# Patient Record
Sex: FEMALE | Race: WHITE | NOT HISPANIC OR LATINO
[De-identification: names, ages, dates, MRNs, and addresses within clinical notes are randomized per-mention and may not be internally consistent; named-entity substitution may affect disease eponyms.]

---

## 2019-01-08 PROBLEM — Z00.00 ENCOUNTER FOR PREVENTIVE HEALTH EXAMINATION: Status: ACTIVE | Noted: 2019-01-08

## 2019-01-09 ENCOUNTER — APPOINTMENT (OUTPATIENT)
Dept: OBGYN | Facility: CLINIC | Age: 79
End: 2019-01-09
Payer: MEDICARE

## 2019-01-09 DIAGNOSIS — Z82.49 FAMILY HISTORY OF ISCHEMIC HEART DISEASE AND OTHER DISEASES OF THE CIRCULATORY SYSTEM: ICD-10-CM

## 2019-01-09 DIAGNOSIS — Z78.9 OTHER SPECIFIED HEALTH STATUS: ICD-10-CM

## 2019-01-09 DIAGNOSIS — Z63.4 DISAPPEARANCE AND DEATH OF FAMILY MEMBER: ICD-10-CM

## 2019-01-09 DIAGNOSIS — R35.1 NOCTURIA: ICD-10-CM

## 2019-01-09 DIAGNOSIS — N89.8 OTHER SPECIFIED NONINFLAMMATORY DISORDERS OF VAGINA: ICD-10-CM

## 2019-01-09 DIAGNOSIS — R35.0 FREQUENCY OF MICTURITION: ICD-10-CM

## 2019-01-09 LAB
BILIRUB UR QL STRIP: NORMAL
DATE COLLECTED: NORMAL
GLUCOSE UR-MCNC: NORMAL
HCG UR QL: 0.2 EU/DL
HEMOCCULT SP1 STL QL: NEGATIVE
HGB UR QL STRIP.AUTO: NORMAL
KETONES UR-MCNC: NORMAL
LEUKOCYTE ESTERASE UR QL STRIP: NORMAL
NITRITE UR QL STRIP: NORMAL
PH UR STRIP: 5.5
PROT UR STRIP-MCNC: NORMAL
QUALITY CONTROL: YES
SP GR UR STRIP: 1.02

## 2019-01-09 PROCEDURE — 99397 PER PM REEVAL EST PAT 65+ YR: CPT

## 2019-01-09 PROCEDURE — 99213 OFFICE O/P EST LOW 20 MIN: CPT | Mod: 25

## 2019-01-09 PROCEDURE — 81003 URINALYSIS AUTO W/O SCOPE: CPT | Mod: QW

## 2019-01-09 PROCEDURE — 82270 OCCULT BLOOD FECES: CPT

## 2019-01-09 SDOH — SOCIAL STABILITY - SOCIAL INSECURITY: DISSAPEARANCE AND DEATH OF FAMILY MEMBER: Z63.4

## 2019-01-09 NOTE — HISTORY OF PRESENT ILLNESS
[1 Year Ago] : 1 year ago [Last Bone Density ___] : Last bone density studies [unfilled] [Last Pap ___] : Last cervical pap smear was [unfilled] [unknown] : the patient is unsure of the date of her LMP [Last Mammogram ___] : Last Mammogram was [unfilled] [Sexually Active] : is not sexually active

## 2019-01-09 NOTE — PHYSICAL EXAM
[Awake] : awake [Alert] : alert [Normal Appearance] : was normal in appearance [Neck Supple] : was supple [Examination Of The Breasts] : a normal appearance [No Masses] : no breast masses were palpable [Soft] : soft [Soft, Nontender] : the abdomen was soft and nontender [No Mass] : no masses were palpated [No HSM] : no hepatosplenomegaly noted [Oriented x3] : oriented to person, place, and time [Normal] : uterus [Atrophy] : atrophy [No Bleeding] : there was no active vaginal bleeding [Uterine Adnexae] : were not tender and not enlarged [Acute Distress] : no acute distress [Enlarged Diffusely] : was not enlarged [Mass] : no breast mass [Nipple Discharge] : no nipple discharge [Axillary LAD] : no axillary lymphadenopathy [Tender] : non tender [de-identified] : hypopigmented bilateral labia minora c/w lichen sclerosis

## 2019-06-25 ENCOUNTER — RECORD ABSTRACTING (OUTPATIENT)
Age: 79
End: 2019-06-25

## 2019-06-25 DIAGNOSIS — Z87.59 PERSONAL HISTORY OF OTHER COMPLICATIONS OF PREGNANCY, CHILDBIRTH AND THE PUERPERIUM: ICD-10-CM

## 2019-06-25 DIAGNOSIS — E78.5 HYPERLIPIDEMIA, UNSPECIFIED: ICD-10-CM

## 2019-06-25 DIAGNOSIS — Z87.898 PERSONAL HISTORY OF OTHER SPECIFIED CONDITIONS: ICD-10-CM

## 2019-06-25 DIAGNOSIS — Z80.41 FAMILY HISTORY OF MALIGNANT NEOPLASM OF OVARY: ICD-10-CM

## 2019-06-25 DIAGNOSIS — Z92.89 PERSONAL HISTORY OF OTHER MEDICAL TREATMENT: ICD-10-CM

## 2019-06-25 DIAGNOSIS — M85.80 OTHER SPECIFIED DISORDERS OF BONE DENSITY AND STRUCTURE, UNSPECIFIED SITE: ICD-10-CM

## 2019-06-25 DIAGNOSIS — N39.41 URGE INCONTINENCE: ICD-10-CM

## 2019-06-25 DIAGNOSIS — Z92.29 PERSONAL HISTORY OF OTHER DRUG THERAPY: ICD-10-CM

## 2019-06-25 DIAGNOSIS — I10 ESSENTIAL (PRIMARY) HYPERTENSION: ICD-10-CM

## 2019-06-25 DIAGNOSIS — N95.2 POSTMENOPAUSAL ATROPHIC VAGINITIS: ICD-10-CM

## 2019-06-25 DIAGNOSIS — Z82.62 FAMILY HISTORY OF OSTEOPOROSIS: ICD-10-CM

## 2019-06-25 LAB — CYTOLOGY CVX/VAG DOC THIN PREP: NORMAL

## 2019-06-26 ENCOUNTER — APPOINTMENT (OUTPATIENT)
Dept: OBGYN | Facility: CLINIC | Age: 79
End: 2019-06-26
Payer: MEDICARE

## 2019-06-26 VITALS
SYSTOLIC BLOOD PRESSURE: 108 MMHG | WEIGHT: 169 LBS | BODY MASS INDEX: 34.07 KG/M2 | DIASTOLIC BLOOD PRESSURE: 74 MMHG | HEIGHT: 59 IN

## 2019-06-26 DIAGNOSIS — B37.9 CANDIDIASIS, UNSPECIFIED: ICD-10-CM

## 2019-06-26 PROCEDURE — 99213 OFFICE O/P EST LOW 20 MIN: CPT

## 2019-06-26 PROCEDURE — 81003 URINALYSIS AUTO W/O SCOPE: CPT | Mod: QW

## 2019-06-26 RX ORDER — UBIDECARENONE/VIT E ACET 100MG-5
CAPSULE ORAL
Refills: 0 | Status: ACTIVE | COMMUNITY

## 2019-06-29 LAB
BILIRUB UR QL STRIP: ABNORMAL
CLARITY UR: CLEAR
COLLECTION METHOD: NORMAL
GLUCOSE UR-MCNC: NORMAL
HGB UR QL STRIP.AUTO: NORMAL
KETONES UR-MCNC: 15
LEUKOCYTE ESTERASE UR QL STRIP: NORMAL
NITRITE UR QL STRIP: NORMAL
PH UR STRIP: 5.5
PROT UR STRIP-MCNC: 30
SP GR UR STRIP: 1.03

## 2019-06-29 NOTE — HISTORY OF PRESENT ILLNESS
[Menarche Age: ____] : age at menarche was [unfilled] [Post-Menopause, No Sxs] : post-menopausal, currently without symptoms [Last Mammogram ___] : Last Mammogram was [unfilled] [Last Bone Density ___] : Last bone density studies [unfilled] [Last Pap ___] : Last cervical pap smear was [unfilled] [Sexually Active] : is not sexually active [Contraception] : does not use contraception

## 2019-06-29 NOTE — COUNSELING
[FreeTextEntry2] : IMportance of good glycemic control in preventing cutaneous skin infections reviewed with the pt

## 2019-06-29 NOTE — REVIEW OF SYSTEMS
[Frequency] : frequency [Urgency] : urgency [Nocturia] : nocturia [Nl] : Integumentary [Sight Problems] : sight problems [Sleep Disturbances] : sleep disturbances [Libido Decreased] : libido decreased [FreeTextEntry1] : excessive itching, rashes

## 2019-09-03 ENCOUNTER — APPOINTMENT (OUTPATIENT)
Dept: OBGYN | Facility: CLINIC | Age: 79
End: 2019-09-03
Payer: MEDICARE

## 2019-09-03 VITALS
BODY MASS INDEX: 32.39 KG/M2 | DIASTOLIC BLOOD PRESSURE: 80 MMHG | HEIGHT: 60 IN | WEIGHT: 165 LBS | SYSTOLIC BLOOD PRESSURE: 120 MMHG

## 2019-09-03 LAB
DATE COLLECTED: NORMAL
HEMOCCULT SP1 STL QL: NEGATIVE
QUALITY CONTROL: YES

## 2019-09-03 PROCEDURE — 99213 OFFICE O/P EST LOW 20 MIN: CPT

## 2019-09-03 PROCEDURE — 82270 OCCULT BLOOD FECES: CPT

## 2019-09-03 NOTE — PHYSICAL EXAM
[Awake] : awake [Alert] : alert [Examination Of The Breasts] : a normal appearance [No Discharge] : no discharge [No Masses] : no breast masses were palpable [Soft] : soft [Oriented x3] : oriented to person, place, and time [Labia Majora] : labia major [Normal] : clitoris [Labia Minora] : labia minora [Atrophy] : atrophy [Uterine Adnexae] : were not tender and not enlarged [Dry Mucosa] : dry mucosa [No Bleeding] : there was no active vaginal bleeding [No Tenderness] : no rectal tenderness [Nl Sphincter Tone] : normal sphincter tone [Acute Distress] : no acute distress [Mass] : no breast mass [Nipple Discharge] : no nipple discharge [Axillary LAD] : no axillary lymphadenopathy [Tender] : non tender [Flat Affect] : affect not flat [Depressed Mood] : not depressed [Vulvar Atrophy] : vulvar atrophy [Uterine Prolapse] : uterine prolapse [Occult Blood] : occult blood test from digital rectal exam was negative [de-identified] : hypopigmented bilateral labia extending to clitoral marcos

## 2019-09-03 NOTE — HISTORY OF PRESENT ILLNESS
[Good] : being in good health [Healthy Diet] : a healthy diet [Weight Concerns] : weight concens [Last Mammogram ___] : Last Mammogram was [unfilled] [Last Bone Density ___] : Last bone density studies [unfilled] [Last Pap ___] : Last cervical pap smear was [unfilled] [No Previous Colonoscopy] : no previous colonoscopy [Postmenopausal] : is postmenopausal [HPV Vaccine NA Due to Age] : HPV vaccine not available to patient due to age [unknown] : the patient is unsure of the date of her LMP [Menarche Age: ____] : age at menarche was [unfilled] [___ Month(s) Ago] : [unfilled] month(s) ago [Regular Exercise] : not exercising regularly [Sexually Active] : is not sexually active

## 2019-09-03 NOTE — END OF VISIT
[FreeTextEntry3] : I, Roosevelt Goldstein, acted solely as a scribe for Dr. Sargent on this date 09/03/2019.\par All medical record entries made by the Scribe were at my, Dr. Sargent’s direction and personally dictated by me on  09/03/2019. I have reviewed the chart and agree that the record accurately reflects my personal performance of the history, physical exam, assessment and plan. I have also personally directed, reviewed, and agreed with the chart.\par \par

## 2020-02-26 ENCOUNTER — RX RENEWAL (OUTPATIENT)
Age: 80
End: 2020-02-26

## 2020-06-05 ENCOUNTER — RESULT REVIEW (OUTPATIENT)
Age: 80
End: 2020-06-05

## 2020-07-22 ENCOUNTER — RX RENEWAL (OUTPATIENT)
Age: 80
End: 2020-07-22

## 2020-07-24 DIAGNOSIS — Z01.818 ENCOUNTER FOR OTHER PREPROCEDURAL EXAMINATION: ICD-10-CM

## 2020-07-26 ENCOUNTER — APPOINTMENT (OUTPATIENT)
Dept: DISASTER EMERGENCY | Facility: CLINIC | Age: 80
End: 2020-07-26

## 2020-07-27 LAB — SARS-COV-2 N GENE NPH QL NAA+PROBE: NOT DETECTED

## 2020-07-29 ENCOUNTER — RESULT REVIEW (OUTPATIENT)
Age: 80
End: 2020-07-29

## 2020-11-16 ENCOUNTER — APPOINTMENT (OUTPATIENT)
Dept: OBGYN | Facility: CLINIC | Age: 80
End: 2020-11-16

## 2020-11-17 ENCOUNTER — APPOINTMENT (OUTPATIENT)
Dept: OBGYN | Facility: CLINIC | Age: 80
End: 2020-11-17
Payer: MEDICARE

## 2020-11-17 VITALS
DIASTOLIC BLOOD PRESSURE: 76 MMHG | BODY MASS INDEX: 31.41 KG/M2 | SYSTOLIC BLOOD PRESSURE: 136 MMHG | WEIGHT: 160 LBS | HEIGHT: 60 IN | TEMPERATURE: 97.1 F

## 2020-11-17 PROCEDURE — 99213 OFFICE O/P EST LOW 20 MIN: CPT

## 2020-11-18 NOTE — DISCUSSION/SUMMARY
[FreeTextEntry1] : I recommended urogyn evaluation for uterine prolapse. Dr. Pascal contact info provided.\par I also recommended vulvar biopsy for area of discoloration seen in posterior labia/vulvar border on the right side, she is declining at this time. She would prefer periodic surveillance at her next annual in Jan/feb. She has not been using clobetasol for the lichen sclerosus.

## 2020-11-18 NOTE — HISTORY OF PRESENT ILLNESS
[HIV test declined] : HIV test declined [Syphilis test declined] : Syphilis test declined [Gonorrhea test declined] : Gonorrhea test declined [Chlamydia test declined] : Chlamydia test declined [Trichomonas test declined] : Trichomonas test declined [HPV test declined] : HPV test declined [Hepatitis B test declined] : Hepatitis B test declined [Hepatitis C test declined] : Hepatitis C test declined [LMP unknown] : LMP unknown [postmenopausal] : postmenopausal [N] : Patient is not sexually active [Y] : Positive pregnancy history [unknown] : Patient is unsure of the date of her LMP [Menarche Age: ____] : age at menarche was [unfilled] [Menopause Age: ____] : age at menopause was [unfilled] [No] : Patient does not have concerns regarding sex [Previously active] : previously active [FreeTextEntry1] : She noticed a sensation of something coming out of her vagina a few days ago. Denies any issues with urination. She has a history of uterine prolapse but this feels like an actual lump. Denies any pain.  [PapSmeardate] : 7/19/20187 [TextBox_31] : wnl [PGHxTotal] : 4 [[facility]] : 3 [[facility] Corporationiving] : 3 [PGHxABInduced] : 1

## 2020-11-18 NOTE — PHYSICAL EXAM
[Appropriately responsive] : appropriately responsive [Alert] : alert [No Acute Distress] : no acute distress [Soft] : soft [Non-tender] : non-tender [Non-distended] : non-distended [Oriented x3] : oriented x3 [Cystocele] : a cystocele [Uterine Prolapse] : uterine prolapse [No Bleeding] : There was no active vaginal bleeding [Normal] : normal [FreeTextEntry1] : lichen sclerosus involving bilateral labia from clitoral marcos to perineum. area on right posterior labia with slight discoloration, darkish hue [FreeTextEntry2] : lichen sclerosus also affecting the labia [FreeTextEntry4] : cervix prolapsing to the introitus  [FreeTextEntry6] : prolapse as above

## 2021-01-15 ENCOUNTER — APPOINTMENT (OUTPATIENT)
Dept: OBGYN | Facility: CLINIC | Age: 81
End: 2021-01-15
Payer: MEDICARE

## 2021-01-15 VITALS
TEMPERATURE: 97.2 F | HEIGHT: 60 IN | DIASTOLIC BLOOD PRESSURE: 100 MMHG | SYSTOLIC BLOOD PRESSURE: 140 MMHG | BODY MASS INDEX: 32.79 KG/M2 | WEIGHT: 167 LBS

## 2021-01-15 DIAGNOSIS — Z01.419 ENCOUNTER FOR GYNECOLOGICAL EXAMINATION (GENERAL) (ROUTINE) W/OUT ABNORMAL FINDINGS: ICD-10-CM

## 2021-01-15 DIAGNOSIS — N81.4 UTEROVAGINAL PROLAPSE, UNSPECIFIED: ICD-10-CM

## 2021-01-15 DIAGNOSIS — Z12.39 ENCOUNTER FOR OTHER SCREENING FOR MALIGNANT NEOPLASM OF BREAST: ICD-10-CM

## 2021-01-15 DIAGNOSIS — L90.0 LICHEN SCLEROSUS ET ATROPHICUS: ICD-10-CM

## 2021-01-15 DIAGNOSIS — Z12.4 ENCOUNTER FOR SCREENING FOR MALIGNANT NEOPLASM OF CERVIX: ICD-10-CM

## 2021-01-15 DIAGNOSIS — Z78.9 OTHER SPECIFIED HEALTH STATUS: ICD-10-CM

## 2021-01-15 DIAGNOSIS — B37.2 CANDIDIASIS OF SKIN AND NAIL: ICD-10-CM

## 2021-01-15 LAB
DATE COLLECTED: NORMAL
HEMOCCULT SP1 STL QL: NEGATIVE
QUALITY CONTROL: YES

## 2021-01-15 PROCEDURE — 99213 OFFICE O/P EST LOW 20 MIN: CPT | Mod: 25

## 2021-01-15 PROCEDURE — G0101: CPT

## 2021-01-15 PROCEDURE — 82270 OCCULT BLOOD FECES: CPT

## 2021-01-15 RX ORDER — ASPIRIN 81 MG
81 TABLET, DELAYED RELEASE (ENTERIC COATED) ORAL
Refills: 0 | Status: DISCONTINUED | COMMUNITY
End: 2021-01-15

## 2021-01-15 NOTE — PHYSICAL EXAM
[Appropriately responsive] : appropriately responsive [Alert] : alert [No Acute Distress] : no acute distress [No Lymphadenopathy] : no lymphadenopathy [Soft] : soft [Non-tender] : non-tender [Non-distended] : non-distended [No HSM] : No HSM [No Lesions] : no lesions [No Mass] : no mass [Oriented x3] : oriented x3 [Examination Of The Breasts] : a normal appearance [No Masses] : no breast masses were palpable [Atrophy] : atrophy [Cystocele] : a cystocele [Dry Mucosa] : dry mucosa [Normal] : normal [Uterine Adnexae] : normal [No Tenderness] : no tenderness [Nl Sphincter Tone] : normal sphincter tone [Uterine Prolapse] : uterine prolapse [FreeTextEntry1] : hypopigmented changes noted c/w Lichen sclerosis  [FreeTextEntry2] : hypopigmented changes noted bilaterally  c/w Lichen sclerosis  [FreeTextEntry5] : mild cervical prolapse and anterior wall detachment noted. [FreeTextEntry9] : Guaiac negative

## 2021-01-15 NOTE — END OF VISIT
[FreeTextEntry3] : I, Roosevelt Goldstein, acted solely as a scribe for Dr. Sargent on this date 01/15/2021.\par All medical record entries made by the Scribe were at my, Dr. Sargent's direction and personally dictated by me on  01/15/2021. I have reviewed the chart and agree that the record accurately reflects my personal performance of the history, physical exam, assessment and plan. I have also personally directed, reviewed, and agreed with the chart.\par \par

## 2021-01-15 NOTE — DISCUSSION/SUMMARY
[FreeTextEntry1] : We discussed the findings on physical exam - mild cervical prolapse and anterior wall detachment noted. hypopigmented changes noted on external genitalia. \par \par Management options of prolapse was discussed including pessary placement or surgical intervention. She denies having any discomfort with prolapse and would like to monitor it. She will consider her options. If her symptoms get worsens she will RTO for evaluation, possible pessary placement  or will see Urogynecologist.  She is schedule to see urogynecology in few weeks. Advised to keep her appointment with Urogynecology. She expressed understanding.\par \par Rx for Elocon cream and Clotrimazole- betamethasone were sent to the pharmacy. \par \par Discussed again the importance of  mammogram screening - patient declines \par \par All questions and concerns were addressed.\par \par \par \par

## 2021-01-15 NOTE — REVIEW OF SYSTEMS
[Patient Intake Form Reviewed] : Patient intake form was reviewed [Chills] : chills [Negative] : Heme/Lymph

## 2021-01-15 NOTE — HISTORY OF PRESENT ILLNESS
[Patient reported colonoscopy was normal] : Patient reported colonoscopy was normal [LMP unknown] : LMP unknown [N] : Patient is not sexually active [Y] : Positive pregnancy history [unknown] : Patient is unsure of the date of her LMP [Menarche Age: ____] : age at menarche was [unfilled] [Previously active] : previously active [Men] : men [Vaginal] : vaginal [No] : No [Patient refuses STI testing] : Patient refuses STI testing [FreeTextEntry1] : Camelia is here for her annual exam. She is doing well. She was seen here in 11/2020 by Dr. Bailey for evaluation of prolapse. She was recommended to see Urogynecologist for further evaluation. \par She is schedule to see  Dr. Pascal in few weeks. She denies feeling any pressure today  She reports some occasional loss of urine.\par \par She also has a history of Lichen sclerosis. She is asymptomatic. She previously used Clobetasol for the treatment.\par \par She is also complaining of continued rash in infrabdominal folds - requests Rx for lotrisone \par \par \par \par  [TextBox_4] : PATIENT PRESENTS FOR ANNUAL EXAM [ColonoscopyDate] : 07/01/2020 [TextBox_43] : AS PER PATIENT [PGHxTotal] : 3 [[facility]] : 3 [[facility]iving] : 3

## 2021-01-20 LAB — CYTOLOGY CVX/VAG DOC THIN PREP: NORMAL

## 2021-01-27 ENCOUNTER — APPOINTMENT (OUTPATIENT)
Dept: PULMONOLOGY | Facility: CLINIC | Age: 81
End: 2021-01-27
Payer: MEDICARE

## 2021-01-27 VITALS
SYSTOLIC BLOOD PRESSURE: 146 MMHG | DIASTOLIC BLOOD PRESSURE: 87 MMHG | HEART RATE: 98 BPM | TEMPERATURE: 97.2 F | OXYGEN SATURATION: 96 %

## 2021-01-27 DIAGNOSIS — Z87.891 PERSONAL HISTORY OF NICOTINE DEPENDENCE: ICD-10-CM

## 2021-01-27 PROCEDURE — 99213 OFFICE O/P EST LOW 20 MIN: CPT

## 2021-01-27 NOTE — HISTORY OF PRESENT ILLNESS
[Former] : former [Never] : never [TextBox_4] : the patient is 80 year F with SOB secondary to COpD/emphysema  She has not had an exacerbation in a while  She has not used O2 and not used the albuterol She has stayed safe and avoids COVID exposure [TextBox_11] : 1/2-1 [YearQuit] : 2018

## 2021-01-27 NOTE — REVIEW OF SYSTEMS
[SOB on Exertion] : sob on exertion [Negative] : Gastrointestinal [TextBox_3] : not active but paces herself  [TextBox_57] : has more Sx with spring time pollen

## 2021-01-27 NOTE — PHYSICAL EXAM
[No Acute Distress] : no acute distress [Normal Appearance] : normal appearance [No Neck Mass] : no neck mass [Normal Rate/Rhythm] : normal rate/rhythm [Normal S1, S2] : normal s1, s2 [No Abnormalities] : no abnormalities [Benign] : benign [Normal Gait] : normal gait [No Focal Deficits] : no focal deficits [TextBox_68] : much reduced BS

## 2021-01-27 NOTE — REASON FOR VISIT
[Follow-Up] : a follow-up visit [COPD] : COPD [Shortness of Breath] : shortness of breath [TextBox_44] : PT IS A 81 Y/O FEMALE HERE FOR A FOLLOW UP. PT STATES THAT SHE IS EXPERIENCING WHEEZING WHICH STARTED A FEW MONTHS AGO, SOB WITH EXERTION.  PT DENIES ANY CHILLS, FEVER, PAIN IN THE CHEST NOR COUGH

## 2021-01-29 ENCOUNTER — APPOINTMENT (OUTPATIENT)
Age: 81
End: 2021-01-29

## 2021-02-01 ENCOUNTER — RX RENEWAL (OUTPATIENT)
Age: 81
End: 2021-02-01

## 2021-04-06 ENCOUNTER — APPOINTMENT (OUTPATIENT)
Dept: PULMONOLOGY | Facility: CLINIC | Age: 81
End: 2021-04-06

## 2021-04-26 ENCOUNTER — APPOINTMENT (OUTPATIENT)
Dept: PULMONOLOGY | Facility: CLINIC | Age: 81
End: 2021-04-26
Payer: MEDICARE

## 2021-04-26 VITALS
SYSTOLIC BLOOD PRESSURE: 122 MMHG | DIASTOLIC BLOOD PRESSURE: 68 MMHG | HEART RATE: 65 BPM | OXYGEN SATURATION: 99 % | TEMPERATURE: 97.2 F

## 2021-04-26 PROCEDURE — 99213 OFFICE O/P EST LOW 20 MIN: CPT

## 2021-04-26 NOTE — HISTORY OF PRESENT ILLNESS
[Former] : former [Never] : never [TextBox_4] : the patient is 80 year F with SOB secondary to COpD/emphysema  She has not had an exacerbation in a while  She has not used O2 and not used the albuterol She has stayed safe and avoids COVID exposure\par \par the patient is 80 year F with SOB from from Emphysema  she reports no interval events but has to use her inhaler when she wakes up in the AM She does not need to use the nebulizer  She has not had an intervening infection  She is Vaccinated now having had both shots     [TextBox_11] : 1/2-1 [YearQuit] : 2018

## 2021-04-26 NOTE — REVIEW OF SYSTEMS
[SOB on Exertion] : sob on exertion [Nocturia] : nocturia [Frequency] : frequency [Arthralgias] : arthralgias [Back Pain] : back pain [Headache] : no headache [Focal Weakness] : no focal weakness [Seizures] : no seizures [Head Injury] : no head injury [Dizziness] : no dizziness [Numbness] : numbness [Memory Loss] : no memory loss [Involuntary Movements] : no involuntary movements [Paralysis] : no paralysis [Confusion] : no confusion [Tremor] : no tremor [Diabetes] : diabetes [Negative] : Dermatologic [TextBox_3] : not active but paces herself  [TextBox_57] : has more Sx with spring time pollen [TextBox_94] : has mild arthritis  [TextBox_104] : skin cancer on her nose several years ago  [TextBox_122] : peripheral neuropathy  [TextBox_144] : has a peripheral neuropathy from her DM

## 2021-04-26 NOTE — ASSESSMENT
[FreeTextEntry1] : COPD and emphysema  stable and she manages to stay active. She is vaccinated and is able to see her family safely   She does not need scripts

## 2021-04-26 NOTE — PHYSICAL EXAM
[No Acute Distress] : no acute distress [Normal Appearance] : normal appearance [No Neck Mass] : no neck mass [Normal Rate/Rhythm] : normal rate/rhythm [Normal S1, S2] : normal s1, s2 [No Abnormalities] : no abnormalities [Benign] : benign [Normal Gait] : normal gait [No Clubbing] : no clubbing [No Cyanosis] : no cyanosis [1+ Pitting] : 1+ pitting [No Focal Deficits] : no focal deficits [Oriented x3] : oriented x3 [TextBox_68] : much reduced BS

## 2021-06-08 RX ORDER — CLOTRIMAZOLE AND BETAMETHASONE DIPROPIONATE 10; .5 MG/G; MG/G
1-0.05 CREAM TOPICAL TWICE DAILY
Qty: 1 | Refills: 1 | Status: COMPLETED | COMMUNITY
Start: 2021-01-15 | End: 2021-06-08

## 2021-06-28 ENCOUNTER — RX RENEWAL (OUTPATIENT)
Age: 81
End: 2021-06-28

## 2021-09-13 ENCOUNTER — APPOINTMENT (OUTPATIENT)
Dept: PULMONOLOGY | Facility: CLINIC | Age: 81
End: 2021-09-13
Payer: MEDICARE

## 2021-09-13 VITALS
OXYGEN SATURATION: 97 % | HEART RATE: 58 BPM | SYSTOLIC BLOOD PRESSURE: 147 MMHG | TEMPERATURE: 97 F | DIASTOLIC BLOOD PRESSURE: 77 MMHG

## 2021-09-13 PROCEDURE — 99214 OFFICE O/P EST MOD 30 MIN: CPT

## 2021-09-13 RX ORDER — NYSTATIN 100000 [USP'U]/G
100000 CREAM TOPICAL 3 TIMES DAILY
Qty: 1 | Refills: 3 | Status: DISCONTINUED | COMMUNITY
Start: 2019-06-26 | End: 2021-09-13

## 2021-09-13 RX ORDER — MOMETASONE FUROATE 1 MG/G
0.1 CREAM TOPICAL DAILY
Qty: 15 | Refills: 0 | Status: DISCONTINUED | COMMUNITY
Start: 2021-01-15 | End: 2021-09-13

## 2021-09-13 NOTE — HISTORY OF PRESENT ILLNESS
[Former] : former [Never] : never [TextBox_4] : the patient is 80 year F with SOB secondary to COpD/emphysema  She has not had an exacerbation in a while  She has not used O2 and not used the albuterol She has stayed safe and avoids COVID exposure\par \par the patient is 80 year F with SOB from from Emphysema  she reports no interval events but has to use her inhaler when she wakes up in the AM She does not need to use the nebulizer  She has not had an intervening infection  She is Vaccinated now having had both shots    \par \par 9/13/21 the patient is doing well and has maintained her functional status  She denies any excessive use of her albuterol inhaler and other inhalers did not help   she has stayed safe from COVID and has had the 3RD shot of the vaccine   [TextBox_11] : 1/2-1 [YearQuit] : 2018

## 2021-09-13 NOTE — REVIEW OF SYSTEMS
[SOB on Exertion] : sob on exertion [Nocturia] : nocturia [Frequency] : frequency [Arthralgias] : arthralgias [Back Pain] : back pain [Numbness] : numbness [Diabetes] : diabetes [Negative] : Dermatologic [Headache] : no headache [Focal Weakness] : no focal weakness [Seizures] : no seizures [Head Injury] : no head injury [Dizziness] : no dizziness [Memory Loss] : no memory loss [Involuntary Movements] : no involuntary movements [Paralysis] : no paralysis [Confusion] : no confusion [Tremor] : no tremor [TextBox_3] : not active but paces herself  [TextBox_57] : has more Sx with spring time pollen [TextBox_94] : has mild arthritis  [TextBox_104] : skin cancer on her nose several years ago  [TextBox_122] : peripheral neuropathy  [TextBox_144] : has a peripheral neuropathy from her DM

## 2021-09-13 NOTE — ASSESSMENT
[FreeTextEntry1] : COPD and emphysema  stable and she manages to stay active. She is vaccinated and is able to see her family safely   She does not need scripts   \par \par 9/13/21 Emphysema and no new change  She does not need a maintenance inhaler  She has maanaged well without one She does not even use the nebulizer

## 2021-09-13 NOTE — PHYSICAL EXAM
[No Acute Distress] : no acute distress [Normal Appearance] : normal appearance [No Neck Mass] : no neck mass [Normal Rate/Rhythm] : normal rate/rhythm [Normal S1, S2] : normal s1, s2 [No Abnormalities] : no abnormalities [Benign] : benign [Normal Gait] : normal gait [No Clubbing] : no clubbing [No Cyanosis] : no cyanosis [1+ Pitting] : 1+ pitting [No Focal Deficits] : no focal deficits [Oriented x3] : oriented x3 [TextBox_2] : overweight  [TextBox_68] : much reduced BS [TextBox_105] : chronic stasis chnges in the lower extremities

## 2022-01-05 ENCOUNTER — APPOINTMENT (OUTPATIENT)
Dept: PULMONOLOGY | Facility: CLINIC | Age: 82
End: 2022-01-05

## 2022-01-12 ENCOUNTER — APPOINTMENT (OUTPATIENT)
Dept: PULMONOLOGY | Facility: CLINIC | Age: 82
End: 2022-01-12
Payer: MEDICARE

## 2022-01-12 VITALS
HEART RATE: 84 BPM | SYSTOLIC BLOOD PRESSURE: 152 MMHG | WEIGHT: 170 LBS | HEIGHT: 60 IN | OXYGEN SATURATION: 97 % | DIASTOLIC BLOOD PRESSURE: 89 MMHG | TEMPERATURE: 97.5 F | BODY MASS INDEX: 33.38 KG/M2

## 2022-01-12 PROCEDURE — 99214 OFFICE O/P EST MOD 30 MIN: CPT

## 2022-01-12 RX ORDER — ALBUTEROL SULFATE 90 UG/1
108 (90 BASE) INHALANT RESPIRATORY (INHALATION)
Qty: 3 | Refills: 3 | Status: COMPLETED | COMMUNITY
Start: 2021-03-29 | End: 2023-01-07

## 2022-01-12 NOTE — HISTORY OF PRESENT ILLNESS
[Former] : former [Never] : never [TextBox_4] : the patient is 80 year F with SOB secondary to COpD/emphysema  She has not had an exacerbation in a while  She has not used O2 and not used the albuterol She has stayed safe and avoids COVID exposure\par \par the patient is 80 year F with SOB from from Emphysema  she reports no interval events but has to use her inhaler when she wakes up in the AM She does not need to use the nebulizer  She has not had an intervening infection  She is Vaccinated now having had both shots    \par \par 9/13/21 the patient is doing well and has maintained her functional status  She denies any excessive use of her albuterol inhaler and other inhalers did not help   she has stayed safe from COVID and has had the 3RD shot of the vaccine  \par \par 1/12/22 the patient has COPD and emphysema She has been vaccinated and Boosted for COVID  she has no new cough or sputum. No wheeze and has a chronic dyspnea on exertion    the patient uses the Albuterol Inhaler if she anticipates an adverse situation or if she has acute sx  She uses preemptively for the cold air    [TextBox_11] : 1/2-1 [YearQuit] : 2018

## 2022-01-12 NOTE — ASSESSMENT
[FreeTextEntry1] : 1/12/11 The patient is feeling well and has maintained a fairly steady lief style  She sees her family but otherwise stays safe during this pandemic  She manges a steady control of her COPD with just the rescue inhaler that I renewed    25miinutes  counselling med reviewe PE and hx

## 2022-04-05 ENCOUNTER — APPOINTMENT (OUTPATIENT)
Dept: PULMONOLOGY | Facility: CLINIC | Age: 82
End: 2022-04-05
Payer: MEDICARE

## 2022-04-05 VITALS
DIASTOLIC BLOOD PRESSURE: 82 MMHG | OXYGEN SATURATION: 97 % | SYSTOLIC BLOOD PRESSURE: 148 MMHG | WEIGHT: 170 LBS | BODY MASS INDEX: 33.38 KG/M2 | HEART RATE: 94 BPM | HEIGHT: 60 IN

## 2022-04-05 DIAGNOSIS — B34.9 VIRAL INFECTION, UNSPECIFIED: ICD-10-CM

## 2022-04-05 DIAGNOSIS — Z12.11 ENCOUNTER FOR SCREENING FOR MALIGNANT NEOPLASM OF COLON: ICD-10-CM

## 2022-04-05 PROCEDURE — 99213 OFFICE O/P EST LOW 20 MIN: CPT

## 2022-04-22 RX ORDER — TIOTROPIUM BROMIDE INHALATION SPRAY 1.56 UG/1
1.25 SPRAY, METERED RESPIRATORY (INHALATION) DAILY
Qty: 3 | Refills: 3 | Status: DISCONTINUED | COMMUNITY
Start: 2022-04-20 | End: 2022-04-22

## 2022-04-22 NOTE — HISTORY OF PRESENT ILLNESS
[Former] : former [Never] : never [Continuous] : Continuous [NC] : Nasal Cannula [PRN] : As needed [TextBox_4] : the patient is 80 year F with SOB secondary to COpD/emphysema  She has not had an exacerbation in a while  She has not used O2 and not used the albuterol She has stayed safe and avoids COVID exposure\par \par the patient is 80 year F with SOB from from Emphysema  she reports no interval events but has to use her inhaler when she wakes up in the AM She does not need to use the nebulizer  She has not had an intervening infection  She is Vaccinated now having had both shots    \par \par 9/13/21 the patient is doing well and has maintained her functional status  She denies any excessive use of her albuterol inhaler and other inhalers did not help   she has stayed safe from COVID and has had the 3RD shot of the vaccine  \par \par 1/12/22 the patient has COPD and emphysema She has been vaccinated and Boosted for COVID  she has no new cough or sputum. No wheeze and has a chronic dyspnea on exertion    the patient uses the Albuterol Inhaler if she anticipates an adverse situation or if she has acute sx  She uses preemptively for the cold air   \par \par 4/5/22 The patient has emphysema and has been feeling weak for a week She went to her PCP and was started on Z shirin and had a Cxr  She has improved There was no PNeumonia on the Cxr  [TextBox_11] : 1/2-1 [YearQuit] : 2018 [FreeTextEntry1] : 4

## 2022-04-22 NOTE — REASON FOR VISIT
[Follow-Up] : a follow-up visit [Cough] : cough [COPD] : COPD [Shortness of Breath] : shortness of breath [Wheezing] : wheezing [TextBox_44] : Pt states that she was feeling quite ill last week - body aches, cough, SOB - no fever.  Pt was given a Z-pack and Pt is feeling better, but still has wheeze and SOB with exertion.  Pt also denies any chest pain.  Pt did need to use supplemental oxygen last week on occasion.

## 2022-04-22 NOTE — ASSESSMENT
[FreeTextEntry1] : 1/12/11 The patient is feeling well and has maintained a fairly steady lief style  She sees her family but otherwise stays safe during this pandemic  She manges a steady control of her COPD with just the rescue inhaler that I renewed    25miinutes  counselling med reviewe PE and hx   \par \par 4/5/22 The patient is feeling better but seems to be describing an acute viral or atypical respiratory infection that is resolving  the patient will have a refill for the nebulized Albuterol  She will call if increased sx  20 minutes time spent counselling Hx/physical

## 2022-04-22 NOTE — PHYSICAL EXAM
[No Acute Distress] : no acute distress [Normal Appearance] : normal appearance [No Neck Mass] : no neck mass [Normal Rate/Rhythm] : normal rate/rhythm [Normal S1, S2] : normal s1, s2 [No Abnormalities] : no abnormalities [Benign] : benign [Normal Gait] : normal gait [No Clubbing] : no clubbing [No Cyanosis] : no cyanosis [1+ Pitting] : 1+ pitting [No Focal Deficits] : no focal deficits [Oriented x3] : oriented x3 [TextBox_2] : overweight BMI 33 [TextBox_68] : much reduced BS [TextBox_105] : chronic stasis chnges in the lower extremities

## 2022-06-03 ENCOUNTER — APPOINTMENT (OUTPATIENT)
Dept: PULMONOLOGY | Facility: CLINIC | Age: 82
End: 2022-06-03
Payer: MEDICARE

## 2022-06-03 VITALS
BODY MASS INDEX: 32.2 KG/M2 | TEMPERATURE: 97.3 F | SYSTOLIC BLOOD PRESSURE: 155 MMHG | HEART RATE: 100 BPM | WEIGHT: 164 LBS | DIASTOLIC BLOOD PRESSURE: 98 MMHG | HEIGHT: 60 IN | OXYGEN SATURATION: 96 %

## 2022-06-03 DIAGNOSIS — J30.2 OTHER SEASONAL ALLERGIC RHINITIS: ICD-10-CM

## 2022-06-03 PROCEDURE — 99214 OFFICE O/P EST MOD 30 MIN: CPT

## 2022-06-03 RX ORDER — LEVOFLOXACIN 500 MG/1
500 TABLET, FILM COATED ORAL
Qty: 7 | Refills: 0 | Status: COMPLETED | COMMUNITY
Start: 2022-04-01

## 2022-06-03 RX ORDER — UMECLIDINIUM 62.5 UG/1
62.5 AEROSOL, POWDER ORAL DAILY
Qty: 1 | Refills: 6 | Status: DISCONTINUED | COMMUNITY
Start: 2022-04-22 | End: 2022-06-03

## 2022-06-03 RX ORDER — METFORMIN ER 500 MG 500 MG/1
500 TABLET ORAL
Qty: 180 | Refills: 0 | Status: ACTIVE | COMMUNITY
Start: 2021-12-27

## 2022-06-03 RX ORDER — DILTIAZEM HYDROCHLORIDE 240 MG/1
240 CAPSULE, EXTENDED RELEASE ORAL
Refills: 0 | Status: DISCONTINUED | COMMUNITY
End: 2022-06-03

## 2022-06-03 RX ORDER — ATORVASTATIN CALCIUM 10 MG/1
10 TABLET, FILM COATED ORAL
Qty: 90 | Refills: 0 | Status: ACTIVE | COMMUNITY
Start: 2022-03-15

## 2022-06-03 RX ORDER — CLOTRIMAZOLE AND BETAMETHASONE DIPROPIONATE 10; .5 MG/G; MG/G
1-0.05 CREAM TOPICAL
Qty: 45 | Refills: 1 | Status: DISCONTINUED | COMMUNITY
Start: 2020-02-26 | End: 2022-06-03

## 2022-06-03 RX ORDER — ATORVASTATIN CALCIUM 20 MG/1
20 TABLET, FILM COATED ORAL
Refills: 0 | Status: DISCONTINUED | COMMUNITY
End: 2022-06-03

## 2022-06-03 RX ORDER — AZITHROMYCIN 250 MG
250 CAPSULE ORAL
Refills: 0 | Status: COMPLETED | COMMUNITY
End: 2022-06-03

## 2022-06-03 RX ORDER — METFORMIN HYDROCHLORIDE 500 MG/1
500 TABLET, COATED ORAL
Refills: 0 | Status: DISCONTINUED | COMMUNITY
End: 2022-06-03

## 2022-06-03 RX ORDER — METOPROLOL SUCCINATE 50 MG/1
50 TABLET, EXTENDED RELEASE ORAL
Refills: 0 | Status: DISCONTINUED | COMMUNITY
End: 2022-06-03

## 2022-06-03 RX ORDER — WARFARIN 10 MG/1
10 TABLET ORAL
Refills: 0 | Status: DISCONTINUED | COMMUNITY
End: 2022-06-03

## 2022-06-03 NOTE — HISTORY OF PRESENT ILLNESS
[Former] : former [Never] : never [NC] : Nasal Cannula [PRN] : As needed [Continuous] : Continuous [TextBox_4] : the patient is 80 year F with SOB secondary to COpD/emphysema  She has not had an exacerbation in a while  She has not used O2 and not used the albuterol She has stayed safe and avoids COVID exposure\par \par the patient is 80 year F with SOB from from Emphysema  she reports no interval events but has to use her inhaler when she wakes up in the AM She does not need to use the nebulizer  She has not had an intervening infection  She is Vaccinated now having had both shots    \par \par 9/13/21 the patient is doing well and has maintained her functional status  She denies any excessive use of her albuterol inhaler and other inhalers did not help   she has stayed safe from COVID and has had the 3RD shot of the vaccine  \par \par 1/12/22 the patient has COPD and emphysema She has been vaccinated and Boosted for COVID  she has no new cough or sputum. No wheeze and has a chronic dyspnea on exertion    the patient uses the Albuterol Inhaler if she anticipates an adverse situation or if she has acute sx  She uses preemptively for the cold air   \par \par 4/5/22 The patient has emphysema and has been feeling weak for a week She went to her PCP and was started on Z shirin and had a Cxr  She has improved There was no PNeumonia on the Cxr \par \par 6/3/22 The patient has taken her allergy medication but has noted the Claritan has not controlled her resp sx. She has rhinitis and has chest tightness  [TextBox_11] : 1/2-1 [YearQuit] : 2018 [FreeTextEntry1] : 4 [FreeTextEntry3] : currently not using as of 6/3/22

## 2022-06-03 NOTE — REASON FOR VISIT
[Follow-Up] : a follow-up visit [Cough] : cough [COPD] : COPD [Emphysema] : emphysema [Shortness of Breath] : shortness of breath [Wheezing] : wheezing [TextBox_44] : 2 months,

## 2022-06-03 NOTE — ASSESSMENT
[FreeTextEntry1] : 1/12/11 The patient is feeling well and has maintained a fairly steady lief style  She sees her family but otherwise stays safe during this pandemic  She manges a steady control of her COPD with just the rescue inhaler that I renewed    25miinutes  counselling med reviewe PE and hx   \par \par 4/5/22 The patient is feeling better but seems to be describing an acute viral or atypical respiratory infection that is resolving  the patient will have a refill for the nebulized Albuterol  She will call if increased sx  20 minutes time spent counselling Hx/physical  \par \par 6/3/22 The patient has an exacerbation of resp sx from her allergies  I have given her a Trelegy inhaler for 14days  She has been instructed on using the inhaler again to make sure she is doing it correctly She has been on Incruse and should be familiar    30minutes spent with the patient  counselling education and Pe and HX

## 2022-06-17 ENCOUNTER — APPOINTMENT (OUTPATIENT)
Dept: PULMONOLOGY | Facility: CLINIC | Age: 82
End: 2022-06-17
Payer: MEDICARE

## 2022-06-17 VITALS
HEIGHT: 60 IN | TEMPERATURE: 97.5 F | WEIGHT: 164 LBS | DIASTOLIC BLOOD PRESSURE: 100 MMHG | SYSTOLIC BLOOD PRESSURE: 165 MMHG | OXYGEN SATURATION: 94 % | BODY MASS INDEX: 32.2 KG/M2 | HEART RATE: 73 BPM

## 2022-06-17 DIAGNOSIS — J45.40 MODERATE PERSISTENT ASTHMA, UNCOMPLICATED: ICD-10-CM

## 2022-06-17 PROCEDURE — 99214 OFFICE O/P EST MOD 30 MIN: CPT

## 2022-06-17 RX ORDER — PREDNISONE 20 MG/1
20 TABLET ORAL
Qty: 15 | Refills: 0 | Status: COMPLETED | COMMUNITY
Start: 2022-04-01 | End: 2022-06-17

## 2022-06-17 RX ORDER — AZITHROMYCIN 250 MG/1
250 TABLET, FILM COATED ORAL
Qty: 6 | Refills: 0 | Status: COMPLETED | COMMUNITY
Start: 2022-04-01 | End: 2022-06-17

## 2022-06-17 NOTE — PHYSICAL EXAM
[No Acute Distress] : no acute distress [Normal Appearance] : normal appearance [No Neck Mass] : no neck mass [Normal Rate/Rhythm] : normal rate/rhythm [Normal S1, S2] : normal s1, s2 [No Abnormalities] : no abnormalities [Benign] : benign [Normal Gait] : normal gait [No Clubbing] : no clubbing [No Cyanosis] : no cyanosis [1+ Pitting] : 1+ pitting [No Focal Deficits] : no focal deficits [Oriented x3] : oriented x3 [TextBox_2] : overweight BMI 33 [TextBox_68] : clear better bs [TextBox_105] : chronic stasis chnges in the lower extremities

## 2022-06-17 NOTE — ASSESSMENT
[FreeTextEntry1] : 1/12/11 The patient is feeling well and has maintained a fairly steady lief style  She sees her family but otherwise stays safe during this pandemic  She manges a steady control of her COPD with just the rescue inhaler that I renewed    25miinutes  counselling med reviewe PE and hx   \par \par 4/5/22 The patient is feeling better but seems to be describing an acute viral or atypical respiratory infection that is resolving  the patient will have a refill for the nebulized Albuterol  She will call if increased sx  20 minutes time spent counselling Hx/physical  \par \par 6/3/22 The patient has an exacerbation of resp sx from her allergies  I have given her a Trelegy inhaler for 14days  She has been instructed on using the inhaler again to make sure she is doing it correctly She has been on Incruse and should be familiar    30minutes spent with the patient  counselling education and Pe and HX\par \par 6/15//22 the patient is doing well and is close to her baseline She is on her second sample of trellegy  She was told to return to using Incruse after the second sample but to call the office if there is a decline in her function with that switch.  I would then prescribe Breo or the equivalent

## 2022-06-17 NOTE — REASON FOR VISIT
[Follow-Up] : a follow-up visit [COPD] : COPD [TextBox_44] : 2 weeks. Pt states trelegy has helped subside her sx. Denies any chest pain, coughing, SOB, or wheezing. BP is elevated today

## 2022-06-17 NOTE — HISTORY OF PRESENT ILLNESS
[Former] : former [Never] : never [Continuous] : Continuous [NC] : Nasal Cannula [PRN] : As needed [TextBox_4] : the patient is 80 year F with SOB secondary to COpD/emphysema  She has not had an exacerbation in a while  She has not used O2 and not used the albuterol She has stayed safe and avoids COVID exposure\par \par the patient is 80 year F with SOB from from Emphysema  she reports no interval events but has to use her inhaler when she wakes up in the AM She does not need to use the nebulizer  She has not had an intervening infection  She is Vaccinated now having had both shots    \par \par 9/13/21 the patient is doing well and has maintained her functional status  She denies any excessive use of her albuterol inhaler and other inhalers did not help   she has stayed safe from COVID and has had the 3RD shot of the vaccine  \par \par 1/12/22 the patient has COPD and emphysema She has been vaccinated and Boosted for COVID  she has no new cough or sputum. No wheeze and has a chronic dyspnea on exertion    the patient uses the Albuterol Inhaler if she anticipates an adverse situation or if she has acute sx  She uses preemptively for the cold air   \par \par 4/5/22 The patient has emphysema and has been feeling weak for a week She went to her PCP and was started on Z shirin and had a Cxr  She has improved There was no PNeumonia on the Cxr \par \par 6/3/22 The patient has taken her allergy medication but has noted the Claritan has not controlled her resp sx. She has rhinitis and has chest tightness \par \par 6/15/22 The patient is here post exacerbation of her asthma She is feeling at her baseline She feels it is from the high pollen level at that time of her attack  [TextBox_11] : 1/2-1 [YearQuit] : 2018 [FreeTextEntry1] : 4 [FreeTextEntry3] : currently not using as of 6/3/22

## 2022-06-27 RX ORDER — WARFARIN 1 MG/1
1 TABLET ORAL
Qty: 270 | Refills: 0 | Status: DISCONTINUED | COMMUNITY
Start: 2021-12-25 | End: 2022-06-27

## 2022-06-30 ENCOUNTER — NON-APPOINTMENT (OUTPATIENT)
Age: 82
End: 2022-06-30

## 2022-07-05 ENCOUNTER — APPOINTMENT (OUTPATIENT)
Dept: PULMONOLOGY | Facility: CLINIC | Age: 82
End: 2022-07-05

## 2022-07-05 VITALS
OXYGEN SATURATION: 95 % | WEIGHT: 165 LBS | BODY MASS INDEX: 32.39 KG/M2 | TEMPERATURE: 98 F | SYSTOLIC BLOOD PRESSURE: 150 MMHG | HEIGHT: 60 IN | DIASTOLIC BLOOD PRESSURE: 92 MMHG | HEART RATE: 85 BPM

## 2022-07-05 PROCEDURE — 99214 OFFICE O/P EST MOD 30 MIN: CPT

## 2022-07-05 NOTE — HISTORY OF PRESENT ILLNESS
[Former] : former [Never] : never [NC] : Nasal Cannula [PRN] : As needed [Continuous] : Continuous [TextBox_4] : the patient is 80 year F with SOB secondary to COpD/emphysema  She has not had an exacerbation in a while  She has not used O2 and not used the albuterol She has stayed safe and avoids COVID exposure\par \par the patient is 80 year F with SOB from from Emphysema  she reports no interval events but has to use her inhaler when she wakes up in the AM She does not need to use the nebulizer  She has not had an intervening infection  She is Vaccinated now having had both shots    \par \par 9/13/21 the patient is doing well and has maintained her functional status  She denies any excessive use of her albuterol inhaler and other inhalers did not help   she has stayed safe from COVID and has had the 3RD shot of the vaccine  \par \par 1/12/22 the patient has COPD and emphysema She has been vaccinated and Boosted for COVID  she has no new cough or sputum. No wheeze and has a chronic dyspnea on exertion    the patient uses the Albuterol Inhaler if she anticipates an adverse situation or if she has acute sx  She uses preemptively for the cold air   \par \par 4/5/22 The patient has emphysema and has been feeling weak for a week She went to her PCP and was started on Z shirin and had a Cxr  She has improved There was no PNeumonia on the Cxr \par \par 6/3/22 The patient has taken her allergy medication but has noted the Claritan has not controlled her resp sx. She has rhinitis and has chest tightness \par \par 6/15/22 The patient is here post exacerbation of her asthma She is feeling at her baseline She feels it is from the high pollen level at that time of her attack \par \par 7/5/22 The patient is doing well and has no new sx  She has stabilized on the trelegy and now is back on the Incruse  The patient reports chris  forgetful and wanted to know if the medications are doing that. I told her that has not bee reported to me by other patients  [TextBox_11] : 1/2-1 [YearQuit] : 2018 [FreeTextEntry1] : 4 [FreeTextEntry3] : currently not using as of 6/3/22

## 2022-07-05 NOTE — ASSESSMENT
[FreeTextEntry1] : 1/12/11 The patient is feeling well and has maintained a fairly steady lief style  She sees her family but otherwise stays safe during this pandemic  She manges a steady control of her COPD with just the rescue inhaler that I renewed    25miinutes  counselling med reviewe PE and hx   \par \par 4/5/22 The patient is feeling better but seems to be describing an acute viral or atypical respiratory infection that is resolving  the patient will have a refill for the nebulized Albuterol  She will call if increased sx  20 minutes time spent counselling Hx/physical  \par \par 6/3/22 The patient has an exacerbation of resp sx from her allergies  I have given her a Trelegy inhaler for 14days  She has been instructed on using the inhaler again to make sure she is doing it correctly She has been on Incruse and should be familiar    30minutes spent with the patient  counselling education and Pe and HX\par \par 6/15//22 the patient is doing well and is close to her baseline She is on her second sample of trellegy  She was told to return to using Incruse after the second sample but to call the office if there is a decline in her function with that switch.  I would then prescribe Breo or the equivalent \par \par 7/5/22 The patient will no be on just the Incruse  IT was explained to the patient hat trelegy has 3 different medications, Incruse has just one of those medications,  the patient is to call if her sx worsen and she will then have a change of medication    time spent 30minutes counseling, education and  PE/HX   f/u 3mos

## 2022-07-05 NOTE — REASON FOR VISIT
[Follow-Up] : a follow-up visit [COPD] : COPD [Shortness of Breath] : shortness of breath [TextBox_44] : 3 weeks,

## 2022-07-05 NOTE — PHYSICAL EXAM
[No Acute Distress] : no acute distress [Normal Appearance] : normal appearance [No Neck Mass] : no neck mass [Normal Rate/Rhythm] : normal rate/rhythm [Normal S1, S2] : normal s1, s2 [Clear to Auscultation Bilaterally] : clear to auscultation bilaterally [No Abnormalities] : no abnormalities [Benign] : benign [Normal Gait] : normal gait [No Clubbing] : no clubbing [No Cyanosis] : no cyanosis [1+ Pitting] : 1+ pitting [No Focal Deficits] : no focal deficits [Oriented x3] : oriented x3 [TextBox_2] : overweight BMI 33 [TextBox_68] : clear better bs [TextBox_105] : chronic stasis chnges in the lower extremities

## 2022-07-05 NOTE — REVIEW OF SYSTEMS
[SOB on Exertion] : sob on exertion [Nocturia] : nocturia [Frequency] : frequency [Arthralgias] : arthralgias [Back Pain] : back pain [Headache] : no headache [Focal Weakness] : no focal weakness [Seizures] : no seizures [Head Injury] : no head injury [Dizziness] : no dizziness [Numbness] : numbness [Memory Loss] : memory loss [Involuntary Movements] : no involuntary movements [Paralysis] : no paralysis [Confusion] : no confusion [Tremor] : no tremor [Diabetes] : diabetes [Negative] : Dermatologic [TextBox_3] : not active but paces herself , reports being forgetful [TextBox_57] : has more Sx with spring time pollen [TextBox_94] : has mild arthritis  [TextBox_104] : skin cancer on her nose several years ago  [TextBox_122] : peripheral neuropathy  [TextBox_144] : has a peripheral neuropathy from her DM

## 2022-09-08 ENCOUNTER — RESULT REVIEW (OUTPATIENT)
Age: 82
End: 2022-09-08

## 2022-09-09 ENCOUNTER — NON-APPOINTMENT (OUTPATIENT)
Age: 82
End: 2022-09-09

## 2022-09-20 ENCOUNTER — NON-APPOINTMENT (OUTPATIENT)
Age: 82
End: 2022-09-20

## 2022-10-03 ENCOUNTER — APPOINTMENT (OUTPATIENT)
Dept: PULMONOLOGY | Facility: CLINIC | Age: 82
End: 2022-10-03

## 2022-10-28 ENCOUNTER — APPOINTMENT (OUTPATIENT)
Dept: PULMONOLOGY | Facility: CLINIC | Age: 82
End: 2022-10-28

## 2022-10-28 VITALS
TEMPERATURE: 97.3 F | DIASTOLIC BLOOD PRESSURE: 88 MMHG | HEART RATE: 72 BPM | SYSTOLIC BLOOD PRESSURE: 145 MMHG | HEIGHT: 60 IN | BODY MASS INDEX: 31.41 KG/M2 | WEIGHT: 160 LBS | OXYGEN SATURATION: 94 %

## 2022-10-28 DIAGNOSIS — K92.2 GASTROINTESTINAL HEMORRHAGE, UNSPECIFIED: ICD-10-CM

## 2022-10-28 PROCEDURE — 99214 OFFICE O/P EST MOD 30 MIN: CPT

## 2022-10-28 NOTE — PHYSICAL EXAM
[No Acute Distress] : no acute distress [Normal Appearance] : normal appearance [No Neck Mass] : no neck mass [Normal Rate/Rhythm] : normal rate/rhythm [Normal S1, S2] : normal s1, s2 [Clear to Auscultation Bilaterally] : clear to auscultation bilaterally [No Abnormalities] : no abnormalities [Benign] : benign [Normal Gait] : normal gait [No Clubbing] : no clubbing [No Cyanosis] : no cyanosis [1+ Pitting] : 1+ pitting [No Focal Deficits] : no focal deficits [Oriented x3] : oriented x3 [TextBox_2] : overweight BMI 31 [TextBox_68] : clear better bs [TextBox_105] : chronic stasis chnges in the lower extremities

## 2022-10-28 NOTE — ASSESSMENT
[FreeTextEntry1] : 1/12/11 The patient is feeling well and has maintained a fairly steady lief style  She sees her family but otherwise stays safe during this pandemic  She manges a steady control of her COPD with just the rescue inhaler that I renewed    25miinutes  counselling med reviewe PE and hx   \par \par 4/5/22 The patient is feeling better but seems to be describing an acute viral or atypical respiratory infection that is resolving  the patient will have a refill for the nebulized Albuterol  She will call if increased sx  20 minutes time spent counselling Hx/physical  \par \par 6/3/22 The patient has an exacerbation of resp sx from her allergies  I have given her a Trelegy inhaler for 14days  She has been instructed on using the inhaler again to make sure she is doing it correctly She has been on Incruse and should be familiar    30minutes spent with the patient  counselling education and Pe and HX\par \par 6/15//22 the patient is doing well and is close to her baseline She is on her second sample of trellegy  She was told to return to using Incruse after the second sample but to call the office if there is a decline in her function with that switch.  I would then prescribe Breo or the equivalent \par \par 7/5/22 The patient will no be on just the Incruse  IT was explained to the patient hat trelegy has 3 different medications, Incruse has just one of those medications,  the patient is to call if her sx worsen and she will then have a change of medication    time spent 30minutes counseling, education and  PE/HX   f/u 3mos \par \par 10/28/22 1) emphysema  more dsypnea  She has been helped with trelegy in the past will trial this again during  and f/u in 2mos  2) given trelegy fo a month's period of time   f/u 2mos  counseling, education, documentation, imaging reviewed, medication reviewed, inhaler demonstrated, old records reviewed, HX and PE

## 2022-10-28 NOTE — HISTORY OF PRESENT ILLNESS
[Former] : former [Never] : never [Continuous] : Continuous [NC] : Nasal Cannula [PRN] : As needed [TextBox_4] : the patient is 80 year F with SOB secondary to COpD/emphysema  She has not had an exacerbation in a while  She has not used O2 and not used the albuterol She has stayed safe and avoids COVID exposure\par \par the patient is 80 year F with SOB from from Emphysema  she reports no interval events but has to use her inhaler when she wakes up in the AM She does not need to use the nebulizer  She has not had an intervening infection  She is Vaccinated now having had both shots    \par \par 9/13/21 the patient is doing well and has maintained her functional status  She denies any excessive use of her albuterol inhaler and other inhalers did not help   she has stayed safe from COVID and has had the 3RD shot of the vaccine  \par \par 1/12/22 the patient has COPD and emphysema She has been vaccinated and Boosted for COVID  she has no new cough or sputum. No wheeze and has a chronic dyspnea on exertion    the patient uses the Albuterol Inhaler if she anticipates an adverse situation or if she has acute sx  She uses preemptively for the cold air   \par \par 4/5/22 The patient has emphysema and has been feeling weak for a week She went to her PCP and was started on Z shirin and had a Cxr  She has improved There was no PNeumonia on the Cxr \par \par 6/3/22 The patient has taken her allergy medication but has noted the Claritan has not controlled her resp sx. She has rhinitis and has chest tightness \par \par 6/15/22 The patient is here post exacerbation of her asthma She is feeling at her baseline She feels it is from the high pollen level at that time of her attack \par \par 7/5/22 The patient is doing well and has no new sx  She has stabilized on the trelegy and now is back on the Incruse  The patient reports chris  forgetful and wanted to know if the medications are doing that. I told her that has not bee reported to me by other patients \par \par 10/28/22  The patient has GI bleeding and the source has not been found She is havin more dyspnea but is not sure that is just form the Anemia   [TextBox_11] : 1/2-1 [YearQuit] : 2018 [FreeTextEntry1] : 4 [FreeTextEntry3] : currently not using as of 6/3/22

## 2022-10-28 NOTE — REASON FOR VISIT
[Follow-Up] : a follow-up visit [COPD] : COPD [Emphysema] : emphysema [Shortness of Breath] : shortness of breath [TextBox_44] : 3 months. Pt complains of increasing SOB with minimal activity. Pt was recently diagnosed with anemia. Pt states she has not found use for the oxygen she has at home.

## 2022-10-28 NOTE — REVIEW OF SYSTEMS
[SOB on Exertion] : sob on exertion [Nocturia] : nocturia [Frequency] : frequency [Arthralgias] : arthralgias [Back Pain] : back pain [Numbness] : numbness [Memory Loss] : memory loss [Diabetes] : diabetes [Negative] : Dermatologic [Headache] : no headache [Focal Weakness] : no focal weakness [Seizures] : no seizures [Head Injury] : no head injury [Dizziness] : no dizziness [Involuntary Movements] : no involuntary movements [Paralysis] : no paralysis [Confusion] : no confusion [Tremor] : no tremor [TextBox_3] : not active but paces herself , reports being forgetful [TextBox_30] : more dyspnea but no coughor sputum  [TextBox_57] : has more Sx with spring time pollen [TextBox_94] : has mild arthritis  [TextBox_104] : skin cancer on her nose several years ago  [TextBox_122] : peripheral neuropathy  [TextBox_144] : has a peripheral neuropathy from her DM

## 2023-05-22 ENCOUNTER — NON-APPOINTMENT (OUTPATIENT)
Age: 83
End: 2023-05-22

## 2023-06-02 ENCOUNTER — NON-APPOINTMENT (OUTPATIENT)
Age: 83
End: 2023-06-02

## 2023-06-02 ENCOUNTER — TRANSCRIPTION ENCOUNTER (OUTPATIENT)
Age: 83
End: 2023-06-02

## 2023-06-05 ENCOUNTER — APPOINTMENT (OUTPATIENT)
Dept: CARE COORDINATION | Facility: HOME HEALTH | Age: 83
End: 2023-06-05
Payer: MEDICARE

## 2023-06-05 ENCOUNTER — TRANSCRIPTION ENCOUNTER (OUTPATIENT)
Age: 83
End: 2023-06-05

## 2023-06-05 VITALS
BODY MASS INDEX: 29.1 KG/M2 | DIASTOLIC BLOOD PRESSURE: 60 MMHG | WEIGHT: 149 LBS | SYSTOLIC BLOOD PRESSURE: 100 MMHG | RESPIRATION RATE: 15 BRPM | OXYGEN SATURATION: 95 % | HEART RATE: 54 BPM

## 2023-06-05 DIAGNOSIS — Z86.73 PERSONAL HISTORY OF TRANSIENT ISCHEMIC ATTACK (TIA), AND CEREBRAL INFARCTION W/OUT RESIDUAL DEFICITS: ICD-10-CM

## 2023-06-05 DIAGNOSIS — Z95.0 PRESENCE OF CARDIAC PACEMAKER: ICD-10-CM

## 2023-06-05 DIAGNOSIS — I48.91 UNSPECIFIED ATRIAL FIBRILLATION: ICD-10-CM

## 2023-06-05 DIAGNOSIS — Z60.2 PROBLEMS RELATED TO LIVING ALONE: ICD-10-CM

## 2023-06-05 DIAGNOSIS — E11.9 TYPE 2 DIABETES MELLITUS W/OUT COMPLICATIONS: ICD-10-CM

## 2023-06-05 DIAGNOSIS — I27.20 PULMONARY HYPERTENSION, UNSPECIFIED: ICD-10-CM

## 2023-06-05 PROCEDURE — 99495 TRANSJ CARE MGMT MOD F2F 14D: CPT

## 2023-06-05 RX ORDER — METOPROLOL TARTRATE 25 MG/1
25 TABLET, FILM COATED ORAL
Qty: 180 | Refills: 0 | Status: DISCONTINUED | COMMUNITY
Start: 2021-12-27 | End: 2023-06-05

## 2023-06-05 RX ORDER — RIVAROXABAN 15 MG/1
15 TABLET, FILM COATED ORAL
Refills: 0 | Status: ACTIVE | COMMUNITY

## 2023-06-05 RX ORDER — CLOTRIMAZOLE 10 MG/G
1 CREAM TOPICAL
Qty: 30 | Refills: 0 | Status: COMPLETED | COMMUNITY
Start: 2022-04-17 | End: 2023-06-05

## 2023-06-05 RX ORDER — TORSEMIDE 10 MG/1
10 TABLET ORAL EVERY OTHER DAY
Refills: 0 | Status: ACTIVE | COMMUNITY

## 2023-06-05 RX ORDER — UMECLIDINIUM BROMIDE AND VILANTEROL TRIFENATATE 62.5; 25 UG/1; UG/1
62.5-25 POWDER RESPIRATORY (INHALATION) DAILY
Qty: 1.5 | Refills: 3 | Status: DISCONTINUED | COMMUNITY
Start: 2022-07-25 | End: 2023-06-05

## 2023-06-05 RX ORDER — NYSTATIN 100MM UNIT
POWDER (EA) MISCELLANEOUS
Qty: 90 | Refills: 1 | Status: COMPLETED | COMMUNITY
Start: 2019-06-26 | End: 2023-06-05

## 2023-06-05 RX ORDER — LOSARTAN POTASSIUM 50 MG/1
50 TABLET, FILM COATED ORAL
Qty: 90 | Refills: 0 | Status: DISCONTINUED | COMMUNITY
Start: 2022-08-29 | End: 2023-06-05

## 2023-06-05 RX ORDER — APIXABAN 5 MG/1
5 TABLET, FILM COATED ORAL
Qty: 180 | Refills: 0 | Status: DISCONTINUED | COMMUNITY
Start: 2022-04-26 | End: 2023-06-05

## 2023-06-05 RX ORDER — HYDRALAZINE HYDROCHLORIDE 25 MG/1
25 TABLET ORAL
Refills: 0 | Status: ACTIVE | COMMUNITY

## 2023-06-05 RX ORDER — CARVEDILOL 12.5 MG/1
12.5 TABLET, FILM COATED ORAL
Refills: 0 | Status: ACTIVE | COMMUNITY

## 2023-06-05 RX ORDER — DILTIAZEM HYDROCHLORIDE 240 MG/1
240 CAPSULE, EXTENDED RELEASE ORAL
Qty: 14 | Refills: 0 | Status: DISCONTINUED | COMMUNITY
Start: 2021-10-25 | End: 2023-06-05

## 2023-06-05 RX ORDER — TRIAMCINOLONE ACETONIDE 1 MG/G
0.1 CREAM TOPICAL
Qty: 80 | Refills: 0 | Status: DISCONTINUED | COMMUNITY
Start: 2022-01-26 | End: 2023-06-05

## 2023-06-05 SDOH — SOCIAL STABILITY - SOCIAL INSECURITY: PROBLEMS RELATED TO LIVING ALONE: Z60.2

## 2023-06-05 NOTE — ASSESSMENT
[FreeTextEntry1] : Camelia Hoffman  is being seen after discharge home from [facility]. She was admitted on 5/20/23 for CHF and discharged home on 6/1/23.  Discharge medications were reviewed and reconciled with the current medication list and medications in home.\par \par 1)CHF-Ef 45%\par weight stable, nonpitting LE edema\par Cont Torsemide every other day\par Cont Carvedilol, hydralazine\par \par [facility] TCM teaching: Enforced with patient need for daily weights. Advised to call for an increase of greater than 2 lbs. in a day or 5 pounds in a week. Adhere to low salt diet and educated patient on foods that should be avoided such as processed or fast food. Limit fluids to 1 liter a day which is 4-5 glasses. Continue medications as ordered.  Follow up with Cardiologist. Contact information given, patient advised to call with any questions/concerns. \par \par 2)Diabetes-A1c-7.6\par Does not check FBG\par Pt to restart Metformin 1,000mg with dinner \par Reviewed dietary modifications-answered all questions\par Pt PCP to recommend endocrinologist\par \par 3)A-fib-HR irregular, no palpitations, dizizness or lightheadedness\par HR 54 at visit, pt to monitor BP and HR daily. Will hold Carvedilol for SBP<110\par Cont Xarelto\par \par 4)COPD-no SOB, wheezing, fever, chills\par p/ox 95% on r/a\par oxygen prn\par Cont Incruse Ellipta\par Use albuterol prn \par \par [facility] TCM teaching: Advised patient to adhere to all medications including demonstrating proper use of inhalers and nebulizers. Encourage the use of proper breathing techniques such as pursed lip breathing or leaning forward during exhalation. Patient understands proper use of oxygen therapy. Increase activity as tolerated and maintain optimal activity levels. Continue coughing and deep breathing exercises including use of Incentive Spirometry. Receive routine pneumococcal and influenza vaccinations. Identify early signs and sx of disease and notify NP/MD. Maintain proper nutrition and hydration. Follow up with Pulmonologist within 7 days of discharge. Contact information given, patient advised to call with any questions/concerns. \par \par Reminded of TCM program and encouraged pt to call with any questions or concerns and especially with worsening of symptoms. Verbalized understanding.\par  yes

## 2023-06-05 NOTE — PHYSICAL EXAM
[No Acute Distress] : no acute distress [Well Nourished] : well nourished [Well Developed] : well developed [Well-Appearing] : well-appearing [No Respiratory Distress] : no respiratory distress  [Clear to Auscultation] : lungs were clear to auscultation bilaterally [No Accessory Muscle Use] : no accessory muscle use [Normal S1, S2] : normal S1 and S2 [No Murmur] : no murmur heard [Bradycardia] : bradycardic [Heart Rate ___] : [unfilled] bpm [Irregularly Irregular] : irregularly irregular [Pedal Pulses Present] : the pedal pulses are present [___ +] : bilateral [unfilled]U+ pitting edema to the ankles [Normal Gait] : normal gait [Coordination Grossly Intact] : coordination grossly intact [No Focal Deficits] : no focal deficits [Speech Grossly Normal] : speech grossly normal [Normal Affect] : the affect was normal [Alert and Oriented x3] : oriented to person, place, and time [Normal Mood] : the mood was normal [Normal Insight/Judgement] : insight and judgment were intact [de-identified] : LLE dressing intact-wound care as per homecare attending today

## 2023-06-05 NOTE — PLAN
[FreeTextEntry1] : PCP Dr. Beltran 6/14\par pulm Dr. Saul 6/12\par pt and DIL called cardiologist Dr. Dietrich office multiple times to make f/u appt. Left messages to call back.\par Will assist with appointment

## 2023-06-05 NOTE — HISTORY OF PRESENT ILLNESS
[Post-hospitalization from ___ Hospital] : Post-hospitalization from [unfilled] Hospital [Admitted on: ___] : The patient was admitted on [unfilled] [Discharged on ___] : discharged on [unfilled] [Discharge Summary] : discharge summary [Discharge Med List] : discharge medication list [Med Reconciliation] : medication reconciliation has been completed [Patient Contacted By: ____] : and contacted by [unfilled] [FreeTextEntry2] : Copied from EMR, "HOSPITAL COURSE: 82 year old female with past medical history of atrial fibrillation, HTN, DM, COPD, carotid disease (complete occlusion BONI), hyperlipidemia, complete heart block, s/p PPM, and CVA in 2009 (with no residual), who presents to the ED presented to the emergency room with worsening shortness of breath and bilateral LE edema for the past few days. AHRF LIKELY 2/2 TO Acute. HFrEF 47 -EF on PET STRESS in 2022. ECHO showed Mildly reduced left ventricular systolic function. The LV wall thickness is mildly increased. There is mild global hypokinesis of the left ventricle. Normal size right ventricle. The right ventricular systolic function is reduced. There is a linear density noted in the right sided heart chambers consistent with a PM/ICD and/or catheter . The right ventricular systolic pressure suggests severe pulmonary hypertension. Severely dilated left atrium. Mild to moderate mitral valve regurgitation. Trace aortic valve regurgitation. No aortic valve stenosis. Severe tricuspid valve regurgitation. RVSP: 63 mmHg. There is no pericardial effusion. The ejection fraction is 45 %. Doppler US of LE shows No evidence of deep venous thrombosis in either lower extremity. CTA chest shows No pulmonary embolism. Small bilateral pleural effusions, loculated on the left. Small pericardial effusion. Small volume ascites. started Lasix and continued with aldactone and Coreg. Patient was transitioned to PO torsemide for discharge. Continued O2 supplementation to maintain SpO2>90%. Monitored Strict I & Os, daily weights. kept her on Continuous pulse oximetry and telemonitoring. Consulted Cardiology / [facility] and followed their recommendations.\par Patient was later transferred to TCU for physical rehabilitation and spironolactone was held due to GISELA. pt's Gisela recovered after holding torsemide 1x day.\par Symbicort was started as a home medication which is also cost effective as compared to previous inhaler prescribed (pt states cost acceptable), outpatient follow up recommended. counseled on daily weights, fluid restriction, cardiac diet, s/s of HF."\par \par Pt seen in her home for transitional care management w/DIL present. She denies SOB, fever, chills. Weight stable , no LE edema. She has dressing on LLE for a wound. She doesn't check FBG, she has not been taking Metformin as she reports she stopped it prior to hospitalization and wasn't advised to stop by her PCP.

## 2023-06-06 ENCOUNTER — TRANSCRIPTION ENCOUNTER (OUTPATIENT)
Age: 83
End: 2023-06-06

## 2023-06-06 RX ORDER — RIVAROXABAN 20 MG/1
20 TABLET, FILM COATED ORAL
Qty: 90 | Refills: 0 | Status: DISCONTINUED | COMMUNITY
Start: 2022-11-10

## 2023-06-12 ENCOUNTER — APPOINTMENT (OUTPATIENT)
Dept: PULMONOLOGY | Facility: CLINIC | Age: 83
End: 2023-06-12
Payer: MEDICARE

## 2023-06-12 VITALS
TEMPERATURE: 97.2 F | SYSTOLIC BLOOD PRESSURE: 120 MMHG | BODY MASS INDEX: 30.43 KG/M2 | HEIGHT: 60 IN | DIASTOLIC BLOOD PRESSURE: 60 MMHG | HEART RATE: 69 BPM | WEIGHT: 155 LBS | OXYGEN SATURATION: 98 %

## 2023-06-12 DIAGNOSIS — I50.9 HEART FAILURE, UNSPECIFIED: ICD-10-CM

## 2023-06-12 PROCEDURE — 99214 OFFICE O/P EST MOD 30 MIN: CPT

## 2023-06-12 RX ORDER — PANTOPRAZOLE 40 MG/1
40 TABLET, DELAYED RELEASE ORAL
Qty: 30 | Refills: 0 | Status: DISCONTINUED | COMMUNITY
Start: 2022-09-19 | End: 2023-06-12

## 2023-06-13 PROBLEM — I50.9 CHF (CONGESTIVE HEART FAILURE): Status: ACTIVE | Noted: 2023-06-05

## 2023-06-13 RX ORDER — BACITRACIN 500 [IU]/G
500 OINTMENT TOPICAL
Refills: 0 | Status: ACTIVE | COMMUNITY

## 2023-06-13 NOTE — REVIEW OF SYSTEMS
[SOB on Exertion] : sob on exertion [Nocturia] : nocturia [Frequency] : frequency [Arthralgias] : arthralgias [Back Pain] : back pain [Headache] : no headache [Focal Weakness] : no focal weakness [Seizures] : no seizures [Head Injury] : no head injury [Dizziness] : no dizziness [Numbness] : numbness [Memory Loss] : memory loss [Involuntary Movements] : no involuntary movements [Paralysis] : no paralysis [Confusion] : no confusion [Tremor] : no tremor [Diabetes] : diabetes [Negative] : Dermatologic [TextBox_3] : not active but paces herself , reports being forgetful [TextBox_30] : more dyspnea but no cough or sputum  [TextBox_57] : has more Sx with spring time pollen [TextBox_94] : has mild arthritis  [TextBox_104] : skin cancer on her nose several years ago  [TextBox_122] : peripheral neuropathy  [TextBox_144] : has a peripheral neuropathy from her DM

## 2023-06-13 NOTE — HISTORY OF PRESENT ILLNESS
[NC] : Nasal Cannula [PRN] : As needed [Continuous] : Continuous [TextBox_4] : the patient is 80 year F with SOB secondary to COpD/emphysema  She has not had an exacerbation in a while  She has not used O2 and not used the albuterol She has stayed safe and avoids COVID exposure\par \par the patient is 80 year F with SOB from from Emphysema  she reports no interval events but has to use her inhaler when she wakes up in the AM She does not need to use the nebulizer  She has not had an intervening infection  She is Vaccinated now having had both shots    \par \par 9/13/21 the patient is doing well and has maintained her functional status  She denies any excessive use of her albuterol inhaler and other inhalers did not help   she has stayed safe from COVID and has had the 3RD shot of the vaccine  \par \par 1/12/22 the patient has COPD and emphysema She has been vaccinated and Boosted for COVID  she has no new cough or sputum. No wheeze and has a chronic dyspnea on exertion    the patient uses the Albuterol Inhaler if she anticipates an adverse situation or if she has acute sx  She uses preemptively for the cold air   \par \par 4/5/22 The patient has emphysema and has been feeling weak for a week She went to her PCP and was started on Z shirin and had a Cxr  She has improved There was no PNeumonia on the Cxr \par \par 6/3/22 The patient has taken her allergy medication but has noted the Claritan has not controlled her resp sx. She has rhinitis and has chest tightness \par \par 6/15/22 The patient is here post exacerbation of her asthma She is feeling at her baseline She feels it is from the high pollen level at that time of her attack \par \par 7/5/22 The patient is doing well and has no new sx  She has stabilized on the trelegy and now is back on the Incruse  The patient reports chris  forgetful and wanted to know if the medications are doing that. I told her that has not bee reported to me by other patients \par \par 10/28/22  The patient has GI bleeding and the source has not been found She is havin more dyspnea but is not sure that is just form the Anemia  \par \par 6/12/2023 as you know 20 is 82-year-old female who just finished 1 month stay in the hospital for CHF.  Patient states that she had shortness of breath that was felt to be secondary to CHF.  Patient is coming to the office for follow-up post hospitalization to assess whether there is any component to his shortness of breath from her emphysema.  She is concerned about the instructions from the hospital regarding her oxygen [FreeTextEntry1] : 4 [FreeTextEntry3] : currently not using as of 6/3/22 Additional Notes: Patient consent was obtained to proceed with the visit and recommended plan of care after discussion of all risks and benefits, including the risks of COVID-19 exposure. Detail Level: Simple

## 2023-06-13 NOTE — REASON FOR VISIT
[Follow-Up] : a follow-up visit [COPD] : COPD [Emphysema] : emphysema [Wheezing] : wheezing [Family Member] : family member [Other: _____] : [unfilled] [TextBox_44] : 8 months. Pt recently at [place], see nurse F/U note. Pt states she had a little bit of wheezing this morning because she was rushing. PT only uses O2 at home when she feels like she needs it. Pt uses 2LPM but at hospital she was at 4LMP.

## 2023-06-13 NOTE — ASSESSMENT
[FreeTextEntry1] : 1/12/11 The patient is feeling well and has maintained a fairly steady lief style  She sees her family but otherwise stays safe during this pandemic  She manges a steady control of her COPD with just the rescue inhaler that I renewed    25miinutes  counselling med reviewe PE and hx   \par \par 4/5/22 The patient is feeling better but seems to be describing an acute viral or atypical respiratory infection that is resolving  the patient will have a refill for the nebulized Albuterol  She will call if increased sx  20 minutes time spent counselling Hx/physical  \par \par 6/3/22 The patient has an exacerbation of resp sx from her allergies  I have given her a Trelegy inhaler for 14days  She has been instructed on using the inhaler again to make sure she is doing it correctly She has been on Incruse and should be familiar    30minutes spent with the patient  counselling education and Pe and HX\par \par 6/15//22 the patient is doing well and is close to her baseline She is on her second sample of trellegy  She was told to return to using Incruse after the second sample but to call the office if there is a decline in her function with that switch.  I would then prescribe Breo or the equivalent \par \par 7/5/22 The patient will no be on just the Incruse  IT was explained to the patient hat trelegy has 3 different medications, Incruse has just one of those medications,  the patient is to call if her sx worsen and she will then have a change of medication    time spent 30minutes counseling, education and  PE/HX   f/u 3mos \par \par 10/28/22 1) emphysema  more dsypnea  She has been helped with trelegy in the past will trial this again during  and f/u in 2mos  2) given trelegy fo a month's period of time   f/u 2mos  counseling, education, documentation, imaging reviewed, medication reviewed, inhaler demonstrated, old records reviewed, HX and PE \par \par 6/13/2023 patient is here with her daughter-in-law.  The patient states that they told her at the hospital that she should be using 3 L of oxygen.  The patient self states that she feels better when she is using the oxygen.  The patient was walked in the power and O2 sat off oxygen remained 95 and greater.  At this time for casual walking patient does not need oxygen.  She is to leave the house to go on more strenuous walks she would probably need.  Oxygen.  Patient does not understand why she feels short of breath although her oxygen remained greater than 95.  I tried to explain to her what the work of breathing is but I am not sure she understood.  Patient's lungs sound clear and she will continue on the Symbicort generically instead of Breo.  Patient will call if there is any new respiratory problem and follow-up in 3 months time spent 30 minutes counseling, education, documentation, imaging reviewed, medication reviewed, i, old records reviewed, HX and PE

## 2023-09-12 ENCOUNTER — APPOINTMENT (OUTPATIENT)
Dept: PULMONOLOGY | Facility: CLINIC | Age: 83
End: 2023-09-12

## 2023-10-02 ENCOUNTER — APPOINTMENT (OUTPATIENT)
Dept: PULMONOLOGY | Facility: CLINIC | Age: 83
End: 2023-10-02
Payer: MEDICARE

## 2023-10-02 VITALS
BODY MASS INDEX: 29.06 KG/M2 | SYSTOLIC BLOOD PRESSURE: 130 MMHG | WEIGHT: 148 LBS | OXYGEN SATURATION: 95 % | DIASTOLIC BLOOD PRESSURE: 80 MMHG | HEART RATE: 77 BPM | HEIGHT: 60 IN

## 2023-10-02 DIAGNOSIS — J43.9 EMPHYSEMA, UNSPECIFIED: ICD-10-CM

## 2023-10-02 PROCEDURE — 99214 OFFICE O/P EST MOD 30 MIN: CPT

## 2023-10-02 RX ORDER — BUDESONIDE AND FORMOTEROL FUMARATE DIHYDRATE 160; 4.5 UG/1; UG/1
160-4.5 AEROSOL RESPIRATORY (INHALATION)
Qty: 3 | Refills: 3 | Status: DISCONTINUED | COMMUNITY
Start: 2023-06-12 | End: 2023-10-02

## 2023-10-02 RX ORDER — LOSARTAN POTASSIUM 50 MG/1
50 TABLET, FILM COATED ORAL
Refills: 0 | Status: ACTIVE | COMMUNITY

## 2023-10-02 RX ORDER — SPIRONOLACTONE 25 MG/1
25 TABLET ORAL
Refills: 0 | Status: ACTIVE | COMMUNITY

## 2023-10-02 RX ORDER — BUDESONIDE AND FORMOTEROL FUMARATE DIHYDRATE 80; 4.5 UG/1; UG/1
80-4.5 AEROSOL RESPIRATORY (INHALATION) TWICE DAILY
Qty: 1 | Refills: 0 | Status: DISCONTINUED | COMMUNITY
End: 2023-10-02

## 2023-10-06 RX ORDER — ALBUTEROL SULFATE 2.5 MG/.5ML
2.5 SOLUTION RESPIRATORY (INHALATION)
Qty: 6 | Refills: 3 | Status: ACTIVE | COMMUNITY
Start: 2022-04-05 | End: 1900-01-01

## 2024-04-16 ENCOUNTER — APPOINTMENT (OUTPATIENT)
Dept: PULMONOLOGY | Facility: CLINIC | Age: 84
End: 2024-04-16

## 2024-04-16 ENCOUNTER — APPOINTMENT (OUTPATIENT)
Dept: PULMONOLOGY | Facility: CLINIC | Age: 84
End: 2024-04-16
Payer: MEDICARE

## 2024-04-16 VITALS
HEART RATE: 50 BPM | DIASTOLIC BLOOD PRESSURE: 70 MMHG | SYSTOLIC BLOOD PRESSURE: 122 MMHG | TEMPERATURE: 97 F | WEIGHT: 140 LBS | OXYGEN SATURATION: 96 % | BODY MASS INDEX: 27.48 KG/M2 | HEIGHT: 60 IN

## 2024-04-16 PROCEDURE — 99214 OFFICE O/P EST MOD 30 MIN: CPT

## 2024-04-16 RX ORDER — BUDESONIDE AND FORMOTEROL FUMARATE DIHYDRATE 80; 4.5 UG/1; UG/1
80-4.5 AEROSOL RESPIRATORY (INHALATION) TWICE DAILY
Qty: 3 | Refills: 3 | Status: ACTIVE | COMMUNITY
Start: 1900-01-01 | End: 1900-01-01

## 2024-04-16 NOTE — REASON FOR VISIT
[Follow-Up] : a follow-up visit [COPD] : COPD [Emphysema] : emphysema [Shortness of Breath] : shortness of breath [TextEntry] : Patient states the change in weather yesterday made it hard to breath.

## 2024-04-16 NOTE — ASSESSMENT
[FreeTextEntry1] : 1/12/11 The patient is feeling well and has maintained a fairly steady lief style She sees her family but otherwise stays safe during this pandemic She manges a steady control of her COPD with just the rescue inhaler that I renewed 25miinutes counselling med reviewe PE and hx  4/5/22 The patient is feeling better but seems to be describing an acute viral or atypical respiratory infection that is resolving the patient will have a refill for the nebulized Albuterol She will call if increased sx 20 minutes time spent counselling Hx/physical  6/3/22 The patient has an exacerbation of resp sx from her allergies I have given her a Trelegy inhaler for 14days She has been instructed on using the inhaler again to make sure she is doing it correctly She has been on Incruse and should be familiar 30minutes spent with the patient counselling education and Pe and HX  6/15//22 the patient is doing well and is close to her baseline She is on her second sample of trellegy She was told to return to using Incruse after the second sample but to call the office if there is a decline in her function with that switch. I would then prescribe Breo or the equivalent  7/5/22 The patient will no be on just the Incruse IT was explained to the patient hat trelegy has 3 different medications, Incruse has just one of those medications, the patient is to call if her sx worsen and she will then have a change of medication time spent 30minutes counseling, education and PE/HX f/u 3mos  10/28/22 1) emphysema more dsypnea She has been helped with trelegy in the past will trial this again during and f/u in 2mos 2) given trelegy fo a month's period of time f/u 2mos counseling, education, documentation, imaging reviewed, medication reviewed, inhaler demonstrated, old records reviewed, HX and PE  6/13/2023 patient is here with her daughter-in-law. The patient states that they told her at the hospital that she should be using 3 L of oxygen. The patient self states that she feels better when she is using the oxygen. The patient was walked in the power and O2 sat off oxygen remained 95 and greater. At this time for casual walking patient does not need oxygen. She is to leave the house to go on more strenuous walks she would probably need. Oxygen. Patient does not understand why she feels short of breath although her oxygen remained greater than 95. I tried to explain to her what the work of breathing is but I am not sure she understood. Patient's lungs sound clear and she will continue on the Symbicort generically instead of Breo. Patient will call if there is any new respiratory problem and follow-up in 3 months time spent 30 minutes counseling, education, documentation, imaging reviewed, medication reviewed, i, old records reviewed, HX and PE  10/2/23 the patient has advanced O2 dependent COPD She has O2 available 24/7 She uses mostly the nocturnal O2 She stays inside the house most of the time She has no acute episode time spent 30 minutes counseling, education, documentation, imaging reviewed, medication reviewed, old records reviewed, HX and PE.  4/15/24  The patient teds to dismiss her medication when she is feeling better  I told her to try and take them on a more regular basis. She  does use them when she starts to have Sx   time spent 30min
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
## 2024-04-23 RX ORDER — UMECLIDINIUM 62.5 UG/1
62.5 AEROSOL, POWDER ORAL DAILY
Qty: 90 | Refills: 3 | Status: ACTIVE | COMMUNITY
Start: 2022-04-25 | End: 1900-01-01

## 2024-04-26 DIAGNOSIS — B37.0 CANDIDAL STOMATITIS: ICD-10-CM

## 2024-04-26 RX ORDER — NYSTATIN 100000 [USP'U]/ML
100000 SUSPENSION ORAL
Qty: 200 | Refills: 3 | Status: ACTIVE | COMMUNITY
Start: 2024-04-26 | End: 1900-01-01

## 2024-09-09 NOTE — REASON FOR VISIT
[Follow-up] : a follow-up of an existing diagnosis [FreeTextEntry1] : abdominal bloating [Follow-Up] : a follow-up visit [COPD] : COPD [TextBox_44] : 4 months. Pt has no pulmonary complaints.

## 2024-09-20 ENCOUNTER — APPOINTMENT (OUTPATIENT)
Dept: PULMONOLOGY | Facility: CLINIC | Age: 84
End: 2024-09-20
Payer: MEDICARE

## 2024-09-20 VITALS
TEMPERATURE: 96.3 F | WEIGHT: 139 LBS | HEART RATE: 59 BPM | DIASTOLIC BLOOD PRESSURE: 80 MMHG | HEIGHT: 60 IN | OXYGEN SATURATION: 98 % | SYSTOLIC BLOOD PRESSURE: 142 MMHG | BODY MASS INDEX: 27.29 KG/M2

## 2024-09-20 PROCEDURE — 99214 OFFICE O/P EST MOD 30 MIN: CPT

## 2024-09-20 RX ORDER — CARVEDILOL 25 MG/1
25 TABLET, FILM COATED ORAL
Refills: 0 | Status: ACTIVE | COMMUNITY

## 2024-09-20 NOTE — ASSESSMENT
[FreeTextEntry1] : 1/12/11 The patient is feeling well and has maintained a fairly steady lief style She sees her family but otherwise stays safe during this pandemic She manges a steady control of her COPD with just the rescue inhaler that I renewed 25miinutes counselling med reviewe PE and hx  4/5/22 The patient is feeling better but seems to be describing an acute viral or atypical respiratory infection that is resolving the patient will have a refill for the nebulized Albuterol She will call if increased sx 20 minutes time spent counselling Hx/physical  6/3/22 The patient has an exacerbation of resp sx from her allergies I have given her a Trelegy inhaler for 14days She has been instructed on using the inhaler again to make sure she is doing it correctly She has been on Incruse and should be familiar 30minutes spent with the patient counselling education and Pe and HX  6/15//22 the patient is doing well and is close to her baseline She is on her second sample of trellegy She was told to return to using Incruse after the second sample but to call the office if there is a decline in her function with that switch. I would then prescribe Breo or the equivalent  7/5/22 The patient will no be on just the Incruse IT was explained to the patient hat trelegy has 3 different medications, Incruse has just one of those medications, the patient is to call if her sx worsen and she will then have a change of medication time spent 30minutes counseling, education and PE/HX f/u 3mos  10/28/22 1) emphysema more dsypnea She has been helped with trelegy in the past will trial this again during and f/u in 2mos 2) given trelegy fo a month's period of time f/u 2mos counseling, education, documentation, imaging reviewed, medication reviewed, inhaler demonstrated, old records reviewed, HX and PE  6/13/2023 patient is here with her daughter-in-law. The patient states that they told her at the hospital that she should be using 3 L of oxygen. The patient self states that she feels better when she is using the oxygen. The patient was walked in the power and O2 sat off oxygen remained 95 and greater. At this time for casual walking patient does not need oxygen. She is to leave the house to go on more strenuous walks she would probably need. Oxygen. Patient does not understand why she feels short of breath although her oxygen remained greater than 95. I tried to explain to her what the work of breathing is but I am not sure she understood. Patient's lungs sound clear and she will continue on the Symbicort generically instead of Breo. Patient will call if there is any new respiratory problem and follow-up in 3 months time spent 30 minutes counseling, education, documentation, imaging reviewed, medication reviewed, i, old records reviewed, HX and PE  10/2/23 the patient has advanced O2 dependent COPD She has O2 available 24/7 She uses mostly the nocturnal O2 She stays inside the house most of the time She has no acute episode time spent 30 minutes counseling, education, documentation, imaging reviewed, medication reviewed, old records reviewed, HX and PE.  4/15/24  The patient teds to dismiss her medication when she is feeling better  I told her to try and take them on a more regular basis. She  does use them when she starts to have Sx   time spent 30min      9/20/24 The patient is generally doing well The Incruse is expensive for the patient and but she has not used it for a month and has no probl;em It is redundent as she is also on Budesonide   I will d/c the Incruse    f/u 5mos time spent 30 min counseling, education, documentation, imaging reviewed, medication reviewed, inhaler demonstrated, old records reviewed, HX and PE

## 2024-09-20 NOTE — HISTORY OF PRESENT ILLNESS
[Former] : former [Never] : never [Continuous] : Continuous [NC] : Nasal Cannula [PRN] : As needed [TextBox_4] : 9/20/24 She is out of INcruse and Budesonide/formoterol  She has slightly more dyspnea She has trouble with the cost of Incruse as it is > $100 She has been using Albuterol inhaler      [TextBox_11] : .5-1 [TextBox_13] : 10 [YearQuit] : 2018 [FreeTextEntry1] : 3

## 2024-09-20 NOTE — REASON FOR VISIT
[Follow-Up] : a follow-up visit [COPD] : COPD [Emphysema] : emphysema [Shortness of Breath] : shortness of breath [TextEntry] : Patient states she needs refills.  Patient has SOB on exertion.  She uses supplemental oxygen as needed but this week due to the weather she had to use it she was having difficulty breathing.  IDALIA

## 2024-09-20 NOTE — PHYSICAL EXAM
[No Acute Distress] : no acute distress [Normal Appearance] : normal appearance [No Neck Mass] : no neck mass [Normal Rate/Rhythm] : normal rate/rhythm [Normal S1, S2] : normal s1, s2 [Clear to Auscultation Bilaterally] : clear to auscultation bilaterally [No Abnormalities] : no abnormalities [Benign] : benign [Normal Gait] : normal gait [No Clubbing] : no clubbing [No Cyanosis] : no cyanosis [1+ Pitting] : 1+ pitting [No Focal Deficits] : no focal deficits [Oriented x3] : oriented x3 [TextBox_2] : overweight BMI 31> 27 [TextBox_68] : clear better bs [TextBox_105] : chronic stasis chnges in the lower extremities

## 2024-09-28 RX ORDER — FLUTICASONE PROPIONATE AND SALMETEROL 250; 50 UG/1; UG/1
250-50 POWDER RESPIRATORY (INHALATION)
Qty: 3 | Refills: 3 | Status: ACTIVE | COMMUNITY
Start: 2024-09-27 | End: 1900-01-01

## 2024-10-07 ENCOUNTER — NON-APPOINTMENT (OUTPATIENT)
Age: 84
End: 2024-10-07

## 2024-10-11 ENCOUNTER — NON-APPOINTMENT (OUTPATIENT)
Age: 84
End: 2024-10-11

## 2024-10-14 ENCOUNTER — APPOINTMENT (OUTPATIENT)
Dept: PULMONOLOGY | Facility: CLINIC | Age: 84
End: 2024-10-14

## 2024-10-28 ENCOUNTER — NON-APPOINTMENT (OUTPATIENT)
Age: 84
End: 2024-10-28

## 2024-11-01 ENCOUNTER — APPOINTMENT (OUTPATIENT)
Dept: PULMONOLOGY | Facility: CLINIC | Age: 84
End: 2024-11-01

## 2025-02-13 ENCOUNTER — APPOINTMENT (OUTPATIENT)
Dept: PULMONOLOGY | Facility: CLINIC | Age: 85
End: 2025-02-13

## 2025-02-28 ENCOUNTER — APPOINTMENT (OUTPATIENT)
Dept: PULMONOLOGY | Facility: CLINIC | Age: 85
End: 2025-02-28
Payer: MEDICARE

## 2025-02-28 VITALS
DIASTOLIC BLOOD PRESSURE: 82 MMHG | BODY MASS INDEX: 27.29 KG/M2 | SYSTOLIC BLOOD PRESSURE: 170 MMHG | HEIGHT: 60 IN | TEMPERATURE: 97.3 F | WEIGHT: 139 LBS | HEART RATE: 61 BPM | OXYGEN SATURATION: 98 %

## 2025-02-28 DIAGNOSIS — J98.4 OTHER DISORDERS OF LUNG: ICD-10-CM

## 2025-02-28 DIAGNOSIS — J44.9 CHRONIC OBSTRUCTIVE PULMONARY DISEASE, UNSPECIFIED: ICD-10-CM

## 2025-02-28 PROCEDURE — 99214 OFFICE O/P EST MOD 30 MIN: CPT | Mod: 25

## 2025-02-28 PROCEDURE — 94010 BREATHING CAPACITY TEST: CPT

## 2025-02-28 PROCEDURE — 94727 GAS DIL/WSHOT DETER LNG VOL: CPT

## 2025-02-28 PROCEDURE — ZZZZZ: CPT

## 2025-02-28 PROCEDURE — 94729 DIFFUSING CAPACITY: CPT

## 2025-02-28 RX ORDER — ACLIDINIUM BROMIDE 400 UG/1
400 POWDER, METERED RESPIRATORY (INHALATION)
Qty: 60 | Refills: 6 | Status: ACTIVE | COMMUNITY
Start: 2025-02-28 | End: 1900-01-01

## 2025-03-10 RX ORDER — UMECLIDINIUM 62.5 UG/1
62.5 AEROSOL, POWDER ORAL DAILY
Qty: 90 | Refills: 3 | Status: ACTIVE | COMMUNITY
Start: 2025-03-05 | End: 1900-01-01

## 2025-03-12 ENCOUNTER — TRANSCRIPTION ENCOUNTER (OUTPATIENT)
Age: 85
End: 2025-03-12

## 2025-03-14 ENCOUNTER — NON-APPOINTMENT (OUTPATIENT)
Age: 85
End: 2025-03-14

## 2025-05-16 NOTE — REASON FOR VISIT
8 mm dark brown pigmented, asymmetric macule with an asymmetric pigment network and irregular borders    [Follow-Up] : a follow-up visit [COPD] : COPD

## 2025-06-26 ENCOUNTER — APPOINTMENT (OUTPATIENT)
Dept: PULMONOLOGY | Facility: CLINIC | Age: 85
End: 2025-06-26